# Patient Record
Sex: MALE | Race: BLACK OR AFRICAN AMERICAN
[De-identification: names, ages, dates, MRNs, and addresses within clinical notes are randomized per-mention and may not be internally consistent; named-entity substitution may affect disease eponyms.]

---

## 2018-11-20 ENCOUNTER — HOSPITAL ENCOUNTER (EMERGENCY)
Dept: HOSPITAL 62 - ER | Age: 53
Discharge: HOME | End: 2018-11-20
Payer: SELF-PAY

## 2018-11-20 VITALS — SYSTOLIC BLOOD PRESSURE: 122 MMHG | DIASTOLIC BLOOD PRESSURE: 81 MMHG

## 2018-11-20 DIAGNOSIS — R55: Primary | ICD-10-CM

## 2018-11-20 DIAGNOSIS — Z82.49: ICD-10-CM

## 2018-11-20 LAB
ADD MANUAL DIFF: NO
ALBUMIN SERPL-MCNC: 4.5 G/DL (ref 3.5–5)
ALP SERPL-CCNC: 48 U/L (ref 38–126)
ALT SERPL-CCNC: 21 U/L (ref 21–72)
ANION GAP SERPL CALC-SCNC: 12 MMOL/L (ref 5–19)
AST SERPL-CCNC: 40 U/L (ref 17–59)
BASOPHILS # BLD AUTO: 0.1 10^3/UL (ref 0–0.2)
BASOPHILS NFR BLD AUTO: 1.2 % (ref 0–2)
BILIRUB DIRECT SERPL-MCNC: 0.4 MG/DL (ref 0–0.4)
BILIRUB SERPL-MCNC: 1.1 MG/DL (ref 0.2–1.3)
BUN SERPL-MCNC: 24 MG/DL (ref 7–20)
CALCIUM: 9.5 MG/DL (ref 8.4–10.2)
CHLORIDE SERPL-SCNC: 104 MMOL/L (ref 98–107)
CK MB SERPL-MCNC: 1.57 NG/ML (ref ?–4.55)
CK SERPL-CCNC: 699 U/L (ref 55–170)
CO2 SERPL-SCNC: 25 MMOL/L (ref 22–30)
EOSINOPHIL # BLD AUTO: 0.2 10^3/UL (ref 0–0.6)
EOSINOPHIL NFR BLD AUTO: 2.9 % (ref 0–6)
ERYTHROCYTE [DISTWIDTH] IN BLOOD BY AUTOMATED COUNT: 12.1 % (ref 11.5–14)
GLUCOSE SERPL-MCNC: 98 MG/DL (ref 75–110)
HCT VFR BLD CALC: 37.2 % (ref 37.9–51)
HGB BLD-MCNC: 12.9 G/DL (ref 13.5–17)
LYMPHOCYTES # BLD AUTO: 2.8 10^3/UL (ref 0.5–4.7)
LYMPHOCYTES NFR BLD AUTO: 42.9 % (ref 13–45)
MCH RBC QN AUTO: 33.3 PG (ref 27–33.4)
MCHC RBC AUTO-ENTMCNC: 34.8 G/DL (ref 32–36)
MCV RBC AUTO: 96 FL (ref 80–97)
MONOCYTES # BLD AUTO: 0.6 10^3/UL (ref 0.1–1.4)
MONOCYTES NFR BLD AUTO: 9.9 % (ref 3–13)
NEUTROPHILS # BLD AUTO: 2.8 10^3/UL (ref 1.7–8.2)
NEUTS SEG NFR BLD AUTO: 43.1 % (ref 42–78)
PLATELET # BLD: 300 10^3/UL (ref 150–450)
POTASSIUM SERPL-SCNC: 4.1 MMOL/L (ref 3.6–5)
PROT SERPL-MCNC: 7.8 G/DL (ref 6.3–8.2)
RBC # BLD AUTO: 3.88 10^6/UL (ref 4.35–5.55)
SODIUM SERPL-SCNC: 141.1 MMOL/L (ref 137–145)
TOTAL CELLS COUNTED % (AUTO): 100 %
TROPONIN I SERPL-MCNC: < 0.012 NG/ML
WBC # BLD AUTO: 6.5 10^3/UL (ref 4–10.5)

## 2018-11-20 PROCEDURE — 84484 ASSAY OF TROPONIN QUANT: CPT

## 2018-11-20 PROCEDURE — 82553 CREATINE MB FRACTION: CPT

## 2018-11-20 PROCEDURE — 36415 COLL VENOUS BLD VENIPUNCTURE: CPT

## 2018-11-20 PROCEDURE — 82550 ASSAY OF CK (CPK): CPT

## 2018-11-20 PROCEDURE — 99284 EMERGENCY DEPT VISIT MOD MDM: CPT

## 2018-11-20 PROCEDURE — 93005 ELECTROCARDIOGRAM TRACING: CPT

## 2018-11-20 PROCEDURE — 93010 ELECTROCARDIOGRAM REPORT: CPT

## 2018-11-20 PROCEDURE — 96360 HYDRATION IV INFUSION INIT: CPT

## 2018-11-20 PROCEDURE — 82272 OCCULT BLD FECES 1-3 TESTS: CPT

## 2018-11-20 PROCEDURE — 80053 COMPREHEN METABOLIC PANEL: CPT

## 2018-11-20 PROCEDURE — 85025 COMPLETE CBC W/AUTO DIFF WBC: CPT

## 2018-11-20 NOTE — ER DOCUMENT REPORT
ED General





- General


Chief Complaint: Syncope


Stated Complaint: SYNCOPE


Time Seen by Provider: 11/20/18 21:43


Mode of Arrival: Ambulatory


Information source: Patient


Notes: 





This is a 53-year-old man with no significant medical problems who presents to 

the emergency room after syncopal event.  Patient states that he was at 

basketball game in the audience and he felt hot, got up to get some air.  He 

states when he opened up the doors to the gym, he felt dizzy and then passed 

out.  EMS arrived and noticed that his blood pressure was on the low side (96/60

).  Patient states that they asked him whether he had chest pain.  He states 

when he took in a deep breath he thought he had some chest wall pain and they 

gave him some aspirin.  Otherwise he denies any chest pain while sitting in the 

stands and denies any chest pain now.  He states that he has had fainting 

episodes in the past on 3 other occasions all similar in nature to today's: He 

gets hot, feels dizzy and then faints.  Patient denies any smoking history.  He 

denies drugs.  He is on no medicines and no allergies.  He is accompanied by 

his sister at the bedside.  Their father had coronary artery disease which 

started in his 60s.  Otherwise there is no significant family history for early 

onset coronary artery disease.  There is no family history of sudden death.





- HPI


Onset: Just prior to arrival


Onset/Duration: Sudden


Quality of pain: No pain


Severity: None


Pain Level: Denies


Associated symptoms: denies: Chest pain, Fever, Shortness of breath


Exacerbated by: Denies


Relieved by: Denies


Similar symptoms previously: Yes


Recently seen / treated by doctor: No





Past Medical History





- General


Information source: Patient





- Social History


Smoking Status: Never Smoker


Cigarette use (# per day): No


Chew tobacco use (# tins/day): No


Frequency of alcohol use: None


Drug Abuse: None


Lives with: Family


Family History: None


Patient has suicidal ideation: No


Patient has homicidal ideation: No





- Medical History


Medical History: Negative


Renal/ Medical History: Denies: Hx Peritoneal Dialysis


Surgical Hx: Negative





Review of Systems





- Review of Systems


Constitutional: denies: Chills, Fever


EENT: No symptoms reported


Cardiovascular: Syncope, Dizziness, Lightheaded.  denies: Chest pain, 

Palpitations, Heart racing


Respiratory: denies: Cough, Hemoptysis, Short of breath


Gastrointestinal: denies: Abdomen distended, Abdominal pain, Diarrhea


Genitourinary: No symptoms reported


Male Genitourinary: No symptoms reported


Musculoskeletal: No symptoms reported


Skin: No symptoms reported


Hematologic/Lymphatic: No symptoms reported


Neurological/Psychological: denies: Confusion, Paralysis, Seizure, Speech 

impairment, Numbness





Physical Exam





- Vital signs


Vitals: 


 











Temp Pulse Resp BP Pulse Ox


 


 97.7 F   66   15   116/73   93 


 


 11/20/18 21:14  11/20/18 21:14  11/20/18 21:14  11/20/18 21:14  11/20/18 21:14











Notes: 





Physical exam:


 


GENERAL: 53-year-old man, alert and oriented x3, no complaints at this time.  

He is in no distress.





HEAD: Atraumatic, normocephalic.





EYES: Pupils equal round and reactive to light, extraocular movements intact, 

sclera anicteric, conjunctiva are normal.





ENT: TMs normal, nares patent, oropharynx clear without exudates.  Moist mucous 

membranes.





NECK: Normal range of motion, supple without obvious mass or JVD.





LUNGS: Breath sounds clear to auscultation bilaterally and equal.  No wheezes 

rales or rhonchi.





HEART: Regular rate and rhythm without murmurs, rubs or gallops.





ABDOMEN: Soft, normoactive bowel sounds.  No tenderness to palpation.  No 

guarding, no rebound.  No masses appreciated.





Rectal: Brown stool, sent for study.





EXTREMITIES: Normal range of motion, no pitting or edema.  No clubbing or 

cyanosis.





NEUROLOGICAL: Cranial nerves II through XII grossly intact.  Normal speech, 

moving all extremities.





PSYCH: Normal mood, normal affect.





SKIN: Warm, Dry, normal turgor, no rashes or lesions noted.





Course





- Vital Signs


Vital signs: 


 











Temp Pulse Resp BP Pulse Ox


 


 98.5 F   68   17   122/81   96 


 


 11/20/18 23:49  11/20/18 21:51  11/20/18 23:49  11/20/18 23:49  11/20/18 23:49














- Laboratory


Result Diagrams: 


 11/20/18 20:30





 11/20/18 20:30


Laboratory results interpreted by me: 


 











  11/20/18 11/20/18





  20:30 20:30


 


RBC  3.88 L 


 


Hgb  12.9 L 


 


Hct  37.2 L 


 


BUN   24 H


 


Creatinine   1.34 H


 


Est GFR (Non-Af Amer)   55 L


 


Creatine Kinase   699 H














- EKG Interpretation by Me


Rate: Normal


Rhythm: NSR - EKG shows normal sinus rhythm with a ventricular rate of 65.  

There does appear to be some re-pole changes in leads L, 1, V2 through V6.  

There is no T wave inversions.  There is no ST depressions.  The patient is 

having no discomfort at this time.





Discharge





- Discharge


Clinical Impression: 


 Vasovagal syncope





Condition: Stable


Disposition: HOME, SELF-CARE


Additional Instructions: 


Recommendations:


Rest, drink plenty of fluids.


I do want you to follow-up with a primary care doctor:


Return to the emergency room for any chest pain, shortness of breath or any 

concerns that you are getting worse.


When you do follow-up with a primary care doctor: Bring a copy of today's labs 

with you when you go.





Recommendations:


 


It is recommended to followup with a primary care doctor within the next 2 

days.  If you do not have a primary care doctor or you are unable to get an 

apointment during that time, I left the number for some internal medicine 

physicians that are affiliated with this hospital.


 


Dr. Janna Irizarry 


[address], [place] [address]


[phone]


 


Dr Galindo


Address: [address] [place] [address] [place] [address] 


[phone]


 


Dr Hilton


Address: [address] [place] [address] , [place] [address] 


[phone]

## 2018-11-21 NOTE — EKG REPORT
SEVERITY:- BORDERLINE ECG -

SINUS RHYTHM

BORDERLINE T ABNORMALITIES, INFERIOR LEADS

:

Confirmed by: Nani Schmitt MD 21-Nov-2018 08:15:41

## 2018-11-21 NOTE — EKG REPORT
SEVERITY:- BORDERLINE ECG -

SINUS RHYTHM

BORDERLINE T ABNORMALITIES, INFERIOR LEADS

:

Confirmed by: Nani Schmitt MD 21-Nov-2018 08:15:35

## 2018-12-30 ENCOUNTER — HOSPITAL ENCOUNTER (EMERGENCY)
Dept: HOSPITAL 62 - ER | Age: 53
Discharge: HOME | End: 2018-12-30
Payer: SELF-PAY

## 2018-12-30 VITALS — SYSTOLIC BLOOD PRESSURE: 148 MMHG | DIASTOLIC BLOOD PRESSURE: 87 MMHG

## 2018-12-30 DIAGNOSIS — R11.2: ICD-10-CM

## 2018-12-30 DIAGNOSIS — K56.7: Primary | ICD-10-CM

## 2018-12-30 DIAGNOSIS — R10.32: ICD-10-CM

## 2018-12-30 DIAGNOSIS — R19.7: ICD-10-CM

## 2018-12-30 LAB
ABSOLUTE LYMPHOCYTES# (MANUAL): 0.2 10^3/UL (ref 0.5–4.7)
ABSOLUTE MONOCYTES # (MANUAL): 0.1 10^3/UL (ref 0.1–1.4)
ABSOLUTE NEUTROPHILS# (MANUAL): 8.9 10^3/UL (ref 1.7–8.2)
ADD MANUAL DIFF: YES
ALBUMIN SERPL-MCNC: 4.6 G/DL (ref 3.5–5)
ALP SERPL-CCNC: 61 U/L (ref 38–126)
ALT SERPL-CCNC: 27 U/L (ref 21–72)
ANION GAP SERPL CALC-SCNC: 9 MMOL/L (ref 5–19)
APPEARANCE UR: CLEAR
APTT PPP: YELLOW S
AST SERPL-CCNC: 49 U/L (ref 17–59)
BASOPHILS NFR BLD MANUAL: 0 % (ref 0–2)
BILIRUB DIRECT SERPL-MCNC: 0.3 MG/DL (ref 0–0.4)
BILIRUB SERPL-MCNC: 0.9 MG/DL (ref 0.2–1.3)
BILIRUB UR QL STRIP: NEGATIVE
BUN SERPL-MCNC: 23 MG/DL (ref 7–20)
CALCIUM: 9.3 MG/DL (ref 8.4–10.2)
CHLORIDE SERPL-SCNC: 105 MMOL/L (ref 98–107)
CO2 SERPL-SCNC: 27 MMOL/L (ref 22–30)
EOSINOPHIL NFR BLD MANUAL: 0 % (ref 0–6)
ERYTHROCYTE [DISTWIDTH] IN BLOOD BY AUTOMATED COUNT: 12.6 % (ref 11.5–14)
GLUCOSE SERPL-MCNC: 116 MG/DL (ref 75–110)
GLUCOSE UR STRIP-MCNC: NEGATIVE MG/DL
HCT VFR BLD CALC: 43.1 % (ref 37.9–51)
HGB BLD-MCNC: 14.3 G/DL (ref 13.5–17)
KETONES UR STRIP-MCNC: NEGATIVE MG/DL
LIPASE SERPL-CCNC: 63.1 U/L (ref 23–300)
MCH RBC QN AUTO: 32 PG (ref 27–33.4)
MCHC RBC AUTO-ENTMCNC: 33.2 G/DL (ref 32–36)
MCV RBC AUTO: 96 FL (ref 80–97)
MONOCYTES % (MANUAL): 1 % (ref 3–13)
NITRITE UR QL STRIP: NEGATIVE
PH UR STRIP: 5 [PH] (ref 5–9)
PLATELET # BLD: 281 10^3/UL (ref 150–450)
PLATELET COMMENT: ADEQUATE
POLYCHROMASIA BLD QL SMEAR: SLIGHT
POTASSIUM SERPL-SCNC: 4.6 MMOL/L (ref 3.6–5)
PROT SERPL-MCNC: 8.4 G/DL (ref 6.3–8.2)
PROT UR STRIP-MCNC: NEGATIVE MG/DL
RBC # BLD AUTO: 4.47 10^6/UL (ref 4.35–5.55)
SEGMENTED NEUTROPHILS % (MAN): 97 % (ref 42–78)
SODIUM SERPL-SCNC: 141.2 MMOL/L (ref 137–145)
SP GR UR STRIP: 1.02
TOTAL CELLS COUNTED BLD: 100
UROBILINOGEN UR-MCNC: NEGATIVE MG/DL (ref ?–2)
VARIANT LYMPHS NFR BLD MANUAL: 2 % (ref 13–45)
WBC # BLD AUTO: 9.2 10^3/UL (ref 4–10.5)

## 2018-12-30 PROCEDURE — 99284 EMERGENCY DEPT VISIT MOD MDM: CPT

## 2018-12-30 PROCEDURE — 96374 THER/PROPH/DIAG INJ IV PUSH: CPT

## 2018-12-30 PROCEDURE — 36415 COLL VENOUS BLD VENIPUNCTURE: CPT

## 2018-12-30 PROCEDURE — 81001 URINALYSIS AUTO W/SCOPE: CPT

## 2018-12-30 PROCEDURE — 80053 COMPREHEN METABOLIC PANEL: CPT

## 2018-12-30 PROCEDURE — 85025 COMPLETE CBC W/AUTO DIFF WBC: CPT

## 2018-12-30 PROCEDURE — 96361 HYDRATE IV INFUSION ADD-ON: CPT

## 2018-12-30 PROCEDURE — 83690 ASSAY OF LIPASE: CPT

## 2018-12-30 PROCEDURE — 74177 CT ABD & PELVIS W/CONTRAST: CPT

## 2018-12-30 PROCEDURE — 96375 TX/PRO/DX INJ NEW DRUG ADDON: CPT

## 2018-12-30 NOTE — RADIOLOGY REPORT (SQ)
EXAM DESCRIPTION:  CT ABD/PELVIS WITH IV ONLY



COMPLETED DATE/TIME:  12/30/2018 2:03 pm



REASON FOR STUDY:  LLQ PAIN



COMPARISON:   None.



TECHNIQUE:  CT scan of the abdomen and pelvis performed using helical scanning technique with dynamic
 intravenous contrast injection.  No oral contrast. Images reviewed with lung, soft tissue, and bone 
windows. Reconstructed coronal and sagittal MPR images reviewed. Delayed images for evaluation of the
 urinary system also acquired. All images stored on PACS.

All CT scanners at this facility use dose modulation, iterative reconstruction, and/or weight based d
osing when appropriate to reduce radiation dose to as low as reasonably achievable (ALARA).

[facility]: Dose Right  [facility]: CareDose    [facility]: Dose Right    [facility]: Teradose 4D    [facility]: Smart Technologies



CONTRAST TYPE AND DOSE:  contrast/concentration: Isovue 350.00 mg/ml; Total Contrast Delivered: 87.0 
ml; Total Saline Delivered: 69.0 ml



RENAL FUNCTION:  GFR > 60.



RADIATION DOSE:  CT Rad equipment meets quality standard of care and radiation dose reduction techniq
ues were employed. CTDIvol: 6.3 - 8.7 mGy. DLP: 823 mGy-cm..



LIMITATIONS:  None.



FINDINGS:  LOWER CHEST: No significant findings. No nodules or infiltrates.

LIVER: Normal size. No masses.  No dilated ducts.

SPLEEN: Normal size. No focal lesions.

PANCREAS: No masses. No significant calcifications. No adjacent inflammation or peripancreatic fluid 
collections. Pancreatic duct not dilated.

GALLBLADDER: Contracted.

ADRENAL GLANDS: No significant masses or asymmetry.

RIGHT KIDNEY AND URETER: No solid masses. No significant calcification. No hydronephrosis or hydroure
ter.

LEFT KIDNEY AND URETER: No solid masses. No significant calcification. No hydronephrosis or hydrouret
er.

AORTA AND VESSELS: No aneurysm. No dissection. Renal arteries, SMA, celiac without stenosis.

RETROPERITONEUM: No retroperitoneal adenopathy, hemorrhage or masses.

BOWEL AND PERITONEAL CAVITY: Mild fluid-filled nondistended loops of small bowel.  Mild fluid through
out the proximal colon.  No active inflammatory changes.  Mild distal diverticular disease.  No ascit
es or abnormal gas.

APPENDIX: Normal.

PELVIS: No mass.  No free fluid. Normal bladder.

ABDOMINAL WALL: No masses. No hernias.

BONES: No significant or acute findings.

OTHER: No other significant finding.



IMPRESSION:  1. Potential mild ileus.  Fluid-filled loops of small and large bowel but no evidence of
 mechanical obstruction.  2. While there is mild diverticulosis in the distal colon, no clear CT evid
ence of active diverticulitis.



TECHNICAL DOCUMENTATION:  JOB ID:  [id number]

Quality ID # 436: Final reports with documentation of one or more dose reduction techniques (e.g., Au
tomated exposure control, adjustment of the mA and/or kV according to patient size, use of iterative 
reconstruction technique)

 2011 [employer]- All Rights Reserved



Reading location - IP/workstation name: [email or web address]

## 2019-11-01 ENCOUNTER — HOSPITAL ENCOUNTER (EMERGENCY)
Dept: HOSPITAL 62 - ER | Age: 54
Discharge: HOME | End: 2019-11-01
Payer: SELF-PAY

## 2019-11-01 VITALS — SYSTOLIC BLOOD PRESSURE: 147 MMHG | DIASTOLIC BLOOD PRESSURE: 100 MMHG

## 2019-11-01 DIAGNOSIS — B35.6: Primary | ICD-10-CM

## 2019-11-01 PROCEDURE — 99283 EMERGENCY DEPT VISIT LOW MDM: CPT

## 2019-11-01 NOTE — ER DOCUMENT REPORT
HPI





- HPI


Time Seen by Provider: 11/01/19 09:53


Pain Level: Denies


Context: 





Patient is a 54-year-old male who presents to the emergency department with a 

chief complaint of rash.  Patient reports for the past 2 weeks he has had a 

irritating itching rash to his groin area bilaterally.  Patient states he has 

been scratching more vigorously at night as it does appear to be worse at night.

 Patient denies drainage or swelling.  Patient states he is just using over-the-

counter lotion to the area.  Patient reports lotion does initially help as it 

causes a cooling and soothing feeling.  Patient denies redness.  Patient states 

he has had an episode similar in the past during the summer when he was sweating

a lot.





Past Medical History





- General


Information source: Patient





- Social History


Smoking Status: Never Smoker


Chew tobacco use (# tins/day): No


Frequency of alcohol use: Heavy


Drug Abuse: None


Lives with: Alone


Family History: Reviewed & Not Pertinent


Patient has suicidal ideation: No


Patient has homicidal ideation: No





- Past Medical History


Cardiac Medical History: Reports: None


Pulmonary Medical History: Reports: None


EENT Medical History: Reports: None


Neurological Medical History: Reports: None


Endocrine Medical History: Reports: None


Renal/ Medical History: Reports: None.  Denies: Hx Peritoneal Dialysis


Malignancy Medical History: Reports None


GI Medical History: Reports: None


Musculoskeletal Medical History: Reports None


Skin Medical History: Reports None


Psychiatric Medical History: Reports: None


Traumatic Medical History: Reports: None


Infectious Medical History: Reports: None


Surgical Hx: Negative





Vertical Provider Document





- CONSTITUTIONAL


Agree With Documented VS: Yes


Exam Limitations: No Limitations


General Appearance: No Apparent Distress





- INFECTION CONTROL


TRAVEL OUTSIDE OF THE [place] IN LAST 30 DAYS: No





- HEENT


HEENT: Atraumatic, Normocephalic, PERRLA





- NECK


Neck: Normal Inspection





- RESPIRATORY


Respiratory: Breath Sounds Normal, No Respiratory Distress





- CARDIOVASCULAR


Cardiovascular: Regular Rate, Regular Rhythm





- GI/ABDOMEN


Gastrointestinal: Abdomen Soft, Abdomen Non-Tender





- MUSCULOSKELETAL/EXTREMETIES


Musculoskeletal/Extremeties: FROM, Non-Tender





- NEURO


Level of Consciousness: Awake, Alert, Appropriate





- DERM


Integumentary: Warm, Rash


Notes: 





Erythematous ring shaped, dry appearing rash noted to the bilateral groin area. 

There is no abscess.  There is no drainage.  There is no testicular or scrotal 

involvement.





Course





- Vital Signs


Vital signs: 


                                        











Temp Pulse Resp BP Pulse Ox


 


 97.7 F   64   18   136/94 H  100 


 


 11/01/19 08:11  11/01/19 08:11  11/01/19 08:11  11/01/19 08:11  11/01/19 08:11














Discharge





- Discharge


Clinical Impression: 


 Tinea cruris





Condition: Stable


Disposition: HOME, SELF-CARE


Additional Instructions: 


Today he was seen in the emergency department for bilateral rash to the groin 

area.  Your symptoms and physical examination are consistent with a jock itch 

type rash.  Please wear loose underwear.  You can use an over-the-counter 

Lotrimin cream/spray to the area.  Use this as directed.  Please keep the area 

clean and dry.  Do not scratch as this can open the skin and cause infection.  

If you do develop a fever, increasing pain or discomfort please return to the 

emergency department.


Referrals: 


[facility] [Provider Group] - Follow up as needed


[facility] [Provider Group] - Follow up as needed